# Patient Record
Sex: FEMALE | Race: WHITE | Employment: OTHER | URBAN - NONMETROPOLITAN AREA
[De-identification: names, ages, dates, MRNs, and addresses within clinical notes are randomized per-mention and may not be internally consistent; named-entity substitution may affect disease eponyms.]

---

## 2018-10-12 ENCOUNTER — TRANSFERRED RECORDS (OUTPATIENT)
Dept: HEALTH INFORMATION MANAGEMENT | Facility: HOSPITAL | Age: 58
End: 2018-10-12

## 2020-02-14 ENCOUNTER — HOSPITAL ENCOUNTER (OUTPATIENT)
Dept: ULTRASOUND IMAGING | Facility: HOSPITAL | Age: 60
Discharge: HOME | End: 2020-02-14
Attending: FAMILY MEDICINE | Admitting: FAMILY MEDICINE
Payer: MEDICARE

## 2020-02-14 DIAGNOSIS — M79.604 RIGHT LEG PAIN: ICD-10-CM

## 2020-02-14 PROCEDURE — 93971 EXTREMITY STUDY: CPT | Mod: TC,RT

## 2020-02-17 ENCOUNTER — ANESTHESIA EVENT (OUTPATIENT)
Dept: SURGERY | Facility: CLINIC | Age: 60
End: 2020-02-17

## 2020-02-17 ENCOUNTER — TELEPHONE (OUTPATIENT)
Dept: SURGERY | Facility: CLINIC | Age: 60
End: 2020-02-17

## 2020-02-17 ENCOUNTER — VIRTUAL VISIT (OUTPATIENT)
Dept: SURGERY | Facility: CLINIC | Age: 60
End: 2020-02-17

## 2020-02-17 ENCOUNTER — HOSPITAL ENCOUNTER (INPATIENT)
Facility: CLINIC | Age: 60
Setting detail: SURGERY ADMIT
End: 2020-02-17
Attending: SURGERY | Admitting: SURGERY

## 2020-02-17 ENCOUNTER — ANESTHESIA (OUTPATIENT)
Dept: SURGERY | Facility: CLINIC | Age: 60
End: 2020-02-17

## 2020-02-17 DIAGNOSIS — K55.9 MESENTERIC ISCHEMIA (H): Primary | ICD-10-CM

## 2020-02-17 RX ORDER — CEFAZOLIN SODIUM 1 G/50ML
2 SOLUTION INTRAVENOUS
Status: CANCELLED | OUTPATIENT
Start: 2020-02-17

## 2020-02-17 RX ORDER — CEFAZOLIN SODIUM 1 G/3ML
1 INJECTION, POWDER, FOR SOLUTION INTRAMUSCULAR; INTRAVENOUS SEE ADMIN INSTRUCTIONS
Status: CANCELLED | OUTPATIENT
Start: 2020-02-17

## 2020-02-17 NOTE — H&P
Surgery H&P  [facility]    Sona Gerber MRN# [id number]   Age: 59 year old YOB: 1960     Date of Admission:  (Not on file)            Assessment:   ***         Recommendations:   ***          History of Present Illness:   Sona Gerber is a 59 year-old female with past medical history significant for depression, history of gastric bypass, atrial flutter          Past Medical History:   Depression  Obesity  Osteoarthritis   Artrial flutter         Past Surgical History:   Lukas-en-Y gastric bypass  Open cholecystectomy (1998)  Right total knee arthroplasty (2019)          Social History:   Patient lives in ***. She is a former smoker, quit in 1978. She rarely drinks alcohol.          Family History:   No family history on file.          Allergies:    Allergies not on file          Medications:   No current facility-administered medications on file prior to encounter.   No current outpatient medications on file prior to encounter.            Review of Systems:   All other review of systems negative, except for what is mentioned above.        Physical Exam:   There were no vitals taken for this visit.  General: Alert, interactive, NAD  Resp: CTAB, no crackles or wheezes  Cardiac: RRR, NS1,S2, No m/r/g  Abdomen: Soft, nontender, nondistended. +BS.  No HSM or masses, no rebound or guarding.  Extremities: No LE edema or obvious joint abnormalities  Skin: Warm and dry, no jaundice or rash  Neuro: A&Ox3, CN 2-12 intact, BOOGIE            Data:   Sodium 145  Potassium 2.9  Cl 105  Bicarb 18  BUN 15  Cr 1.05    WBC 11.4  Hgb 13.4  Plt 251    Lactic acid 6.9     CT AP 2/17/2020  FINDINGS:    There is a ventral hernia containing a segment of small bowel, with some degree of associated mechanical obstruction.    There are segments of small bowel in the right abdomen with wall thickening, decreased enhancement and mesenteric ischemia. These findings are strongly suggestive of ischemic bowel. There  may be an associated mid gut volvulus. The left-sided small bowel segments are dilated but show normal wall enhancement and no thickening. The colon is of normal caliber.    Post gastric bypass changes are apparent.    No focal liver lesion is seen. Cholecystectomy changes are again noted. The common duct remains prominent.. No pancreatic mass or abnormal fluid collection is seen.    The kidneys concentrate contrast bilaterally. No hydronephrosis is seen. The urinary bladder is unremarkable.    No abnormality of the spleen or adrenals is seen.    No free fluid is present.    No acute lung base is pathology is seen.    The findings were discussed with Dr. Somers at 0131.    IMPRESSION:    Ventral hernia containing small bowel, with associated mechanical obstruction.    Apparent ischemic small bowel segments in the right abdomen; the CT pattern is concerning for mid gut volvulus.        Patient was discussed with staff, Dr. Pierre Carlos MD  Surgery Resident, PGY2

## 2020-02-17 NOTE — TELEPHONE ENCOUNTER
Was notified patient was diverted in helicopter. With a lactate of 7 and concern for ischemic bowel, it will be my recommendation that an initial exploration be performed by a general surgeon if possible so it can be done in a timely manner. We would be happy to accept the patient after surgery if needed (e.g. if left in discontinuity, etc). Still investigating what is going on. OR12 is ready but on hold.

## 2020-02-17 NOTE — ANESTHESIA PREPROCEDURE EVALUATION
Anesthesia Pre-Procedure Evaluation    Patient: Sona Gerber   MRN:     [id number] Gender:   female   Age:    59 year old :      1960        Preoperative Diagnosis: * No pre-op diagnosis entered *   Procedure(s):  LAPAROTOMY     LABS:  CBC: No results found for: WBC, HGB, HCT, PLT  BMP: No results found for: NA, POTASSIUM, CHLORIDE, CO2, BUN, CR, GLC  COAGS: No results found for: PTT, INR, FIBR  POC: No results found for: BGM, HCG, HCGS  OTHER: No results found for: PH, LACT, A1C, PREETI, PHOS, MAG, ALBUMIN, PROTTOTAL, ALT, AST, GGT, ALKPHOS, BILITOTAL, BILIDIRECT, LIPASE, AMYLASE, ESTIVEN, TSH, T4, T3, CRP, SED     Preop Vitals    BP Readings from Last 3 Encounters:   No data found for BP    Pulse Readings from Last 3 Encounters:   No data found for Pulse      Resp Readings from Last 3 Encounters:   No data found for Resp    SpO2 Readings from Last 3 Encounters:   No data found for SpO2      Temp Readings from Last 1 Encounters:   No data found for Temp    Ht Readings from Last 1 Encounters:   No data found for Ht      Wt Readings from Last 1 Encounters:   No data found for Wt    There is no height or weight on file to calculate BMI.     LDA:        No past medical history on file.   No past surgical history on file.   Allergies not on file     Anesthesia Evaluation     .             ROS/MED HX    ENT/Pulmonary:       Neurologic:       Cardiovascular:         METS/Exercise Tolerance:     Hematologic: Comments: Last Hgb 13.4        Musculoskeletal:         GI/Hepatic: Comment: CT: Ventral hernia containing small bowel, with associated mechanical obstruction. Apparent ischemic small bowel segments in the right abdomen; the CT pattern is concerning for mid gut volvulus.    S/p RYGB        Renal/Genitourinary: Comment: Last Potassium 2.9        Endo:     (+) Obesity, .      Psychiatric:     (+) psychiatric history depression      Infectious Disease:         Malignancy:         Other:                     MEAGHAN MOSCOSO AN  PHYSICAL EXAM    Assessment:   ASA SCORE: 4 emergent           Plan:   Anes. Type:  General   Pre-Medication: None   Induction:  IV (Standard)   Airway: ETT; Oral   Access/Monitoring: PIV; 2nd PIV   Maintenance: Balanced     Postop Plan:   Postop Pain: Opioids  Postop Sedation/Airway: Not planned  Disposition: Inpatient/Admit     PONV Management:   Adult Risk Factors: Female, Postop Opioids   Prevention: Ondansetron, Dexamethasone                   Joe Guerrero MD

## 2022-10-13 NOTE — PROGRESS NOTES
Called as pt has concern for ischemic bowel (internal hernia vs ventral hernia), now 14 yrs s/p open RYGB. Posted patient for emergent exploration pending transfer. Will see patient in PACU and likely plan to proceed with surgery. Spoke with OR.     Luther Jay MD     Entered patient's room for morning vital signs and head to toe assessment. Patient resting in bed at this time. A&O x 1 and lethargic. Sterna; rub done to wake patient. Patient opened eyes to sternal rub, but did not stay awake for very long. Patient did not look to be in pain from adult nonverbal pain scale. Head to toe assessment and vital signs completed at this time, see flowsheets for more details. Arnold catheter in place and draining phylicia colored urine at this time. Patient repositioned at this time. Call light within reach. Bed alarm on. Bed wheels locked. Bed in lowest position. Will continue to monitor patient.

## 2024-10-28 ENCOUNTER — HOSPITAL ENCOUNTER (EMERGENCY)
Facility: HOSPITAL | Age: 64
Discharge: LEFT BEFORE DISCHARGE | End: 2024-10-28
Payer: MEDICARE

## 2024-10-28 VITALS
DIASTOLIC BLOOD PRESSURE: 98 MMHG | HEART RATE: 82 BPM | RESPIRATION RATE: 18 BRPM | OXYGEN SATURATION: 97 % | SYSTOLIC BLOOD PRESSURE: 168 MMHG | TEMPERATURE: 97.5 F

## 2024-10-28 ASSESSMENT — COLUMBIA-SUICIDE SEVERITY RATING SCALE - C-SSRS
2. HAVE YOU ACTUALLY HAD ANY THOUGHTS OF KILLING YOURSELF IN THE PAST MONTH?: NO
6. HAVE YOU EVER DONE ANYTHING, STARTED TO DO ANYTHING, OR PREPARED TO DO ANYTHING TO END YOUR LIFE?: NO
1. IN THE PAST MONTH, HAVE YOU WISHED YOU WERE DEAD OR WISHED YOU COULD GO TO SLEEP AND NOT WAKE UP?: NO

## 2024-10-28 NOTE — ED TRIAGE NOTES
This morning woke with some tingling throughout entire body.  Took paxil and another medication and then it went away.  Then when talking with niece again she felt the same way and got off the phone with her and came to hospital.  Yesterday was anniversary of ex- death anniversary.  Denies any numbness feelings currently in triage.    BEFAST negative

## 2024-10-28 NOTE — ED TRIAGE NOTES
Pt to admitting desk reporting she would like to leave. RN out to speak with pt. Pt wondering how long it would be. RN informed pt that we are unable to accurately give this information.     Explained to pt that leaving prior to a medical screening exam could result in risks such as injury and or death. Pt informed she can return to the ED at any point in time.     Pt signed declination of medical screening exam.

## 2024-10-29 ENCOUNTER — TELEPHONE (OUTPATIENT)
Dept: EMERGENCY MEDICINE | Facility: HOSPITAL | Age: 64
End: 2024-10-29

## 2024-10-29 NOTE — ED NOTES
Care Transitions focused note:      Follow up call on patient who left without being seen.    Pt stated she is much better today.  Has a follow up appt on Thursday at [facility].  Encouraged patient to make that appointment or to come back to ED if symptoms progress.    Pt agreeable to this plan.  No further concerns at this time.    KENDALL Guzman

## (undated) RX ORDER — CEFAZOLIN SODIUM 1 G/50ML
SOLUTION INTRAVENOUS
Status: DISPENSED
Start: 2020-02-17